# Patient Record
(demographics unavailable — no encounter records)

---

## 2018-06-06 NOTE — MR
EXAMINATION TYPE: MR knee LT wo con

 

DATE OF EXAM: 6/6/2018

 

COMPARISON: NONE

 

HISTORY: Tear of lateral meniscus, left knee

 

TECHNIQUE: Multiplanar, multisequence imaging of the left knee is performed without IV contrast.

 

FINDINGS:

 

MEDIAL MENISCUS: There is some very subtle increased signal within the posterior horn of the medial m
eniscus. Some mild internal derangement may be present best visualized on the proton density images. 
This is extremely subtle.  This is somewhat more inferior within the meniscus suggesting this is unli
shira to be vascular structures. Series 301 image 22 The anterior horn of the medial meniscus appears 
intact.

 

LATERAL MENISCUS: Anterior and posterior horns are intact without tear.

 

CRUCIATE LIGAMENTS: The anterior and posterior cruciate ligaments are intact and unremarkable.

 

COLLATERAL LIGAMENTS: The medial collateral ligament and lateral collateral ligament complex are inta
ct and unremarkable.

 

EXTENSOR MECHANISM: Visualized quadriceps and patellar tendons are intact.

 

EFFUSION:  No significant suprapatellar joint effusion.

 

POPLITEAL CYST:  No popliteal/baker cyst.

 

TRICOMPARTMENT SPACES: Normal

 

CARTILAGE: Normal

 

BONE MARROW SIGNAL: No focal abnormal marrow signal is appreciated.

 

OTHER:  No additional significant abnormality is appreciated.

 

IMPRESSION: 

Extremely subtle signal within the posterior horn medial meniscus. Communication with an articular zarate
rface is not identified. Some very minimal internal derangement may be present.